# Patient Record
Sex: FEMALE | Race: WHITE
[De-identification: names, ages, dates, MRNs, and addresses within clinical notes are randomized per-mention and may not be internally consistent; named-entity substitution may affect disease eponyms.]

---

## 2018-05-21 ENCOUNTER — HOSPITAL ENCOUNTER (EMERGENCY)
Dept: HOSPITAL 33 - ED | Age: 68
Discharge: HOME | End: 2018-05-21
Payer: MEDICARE

## 2018-05-21 VITALS — SYSTOLIC BLOOD PRESSURE: 166 MMHG | DIASTOLIC BLOOD PRESSURE: 77 MMHG | HEART RATE: 90 BPM | OXYGEN SATURATION: 97 %

## 2018-05-21 DIAGNOSIS — I10: ICD-10-CM

## 2018-05-21 DIAGNOSIS — I25.10: ICD-10-CM

## 2018-05-21 DIAGNOSIS — M10.9: ICD-10-CM

## 2018-05-21 DIAGNOSIS — Z79.899: ICD-10-CM

## 2018-05-21 DIAGNOSIS — M19.90: ICD-10-CM

## 2018-05-21 DIAGNOSIS — G47.30: ICD-10-CM

## 2018-05-21 DIAGNOSIS — I50.9: ICD-10-CM

## 2018-05-21 DIAGNOSIS — B02.9: Primary | ICD-10-CM

## 2018-05-21 DIAGNOSIS — E03.9: ICD-10-CM

## 2018-05-21 DIAGNOSIS — I25.2: ICD-10-CM

## 2018-05-21 PROCEDURE — 99283 EMERGENCY DEPT VISIT LOW MDM: CPT

## 2018-05-21 NOTE — ERPHSYRPT
- History of Present Illness


Time Seen by Provider: 05/21/18 17:38


Source: patient


Exam Limitations: no limitations


Patient Subjective Stated Complaint: pt here for hives to forehead and hair 

since friday,


Triage Nursing Assessment: pt alert, resp easy, skin w/d/p. had red hives to 

forehead and hair


Physician History: 





The patient is a 67-year-old female who complains of a itchy painful splotchy 

red rash over her left eyebrow, left fore head, and into her scalp on the left 

side her head since Friday.  She also states that she had some of the rash on 

the right side of her head in the scalp as well.  On Saturday and Sunday she 

had some of the rash on the left side of her face including cheek and the left 

side of her neck that has disappeared.  She had placed over-the-counter 

hydrocortisone cream on her neck and face.  She's been taking Benadryl.  She 

thought she was having allergic reaction because she used to take allergy 

shots.  The swelling and redness around her left eye has improved.  She comes 

in because she doesn't know what it is is causing this rash.  Her past medical 

history is significant for gout, congestive heart failure, and hypertension.


Timing/Duration: day(s) (3), gradual onset, improved


Quality: itchy, painful


Severity: moderate


Location: scalp, face


Possible Causes: no cause identified


Modifying Factors: Improves With: antihistamine, prednisone (pt took 2 

prednisone without relieft), topical steriods


Associated Symptoms: rash, No difficulty breathing


Allergies/Adverse Reactions: 








Penicillins Allergy (Verified 05/21/18 16:50)


 


Sulfa (Sulfonamide Antibiotics) Allergy (Verified 05/21/18 16:50)


 





Home Medications: 








Allopurinol [Allopurinol] 100 mg DAILY 05/21/18 [History]


Levothyroxine Sodium 75 mcg DAILY 05/21/18 [History]


Lisinopril [Lisinopril] 40 mg DAILY 05/21/18 [History]


Oxybutynin [Oxytrol] 1 ea DAILY 05/21/18 [History]





Hx Influenza Vaccination/Date Given: Yes


Hx Pneumococcal Vaccination/Date Given: No


Immunizations Up to Date: Yes





- Review of Systems


Constitutional: No Fever, No Chills


Eyes: No Symptoms


Ears, Nose, & Throat: No Symptoms


Respiratory: No Cough, No Dyspnea


Cardiac: No Chest Pain, No Edema, No Syncope


Abdominal/Gastrointestinal: No Abdominal Pain, No Nausea, No Vomiting, No 

Diarrhea


Genitourinary Symptoms: No Dysuria


Musculoskeletal: No Back Pain, No Neck Pain


Skin: Rash


Neurological: No Dizziness, No Focal Weakness, No Sensory Changes


Psychological: No Symptoms


Endocrine: No Symptoms


Hematologic/Lymphatic: No Symptoms


Immunological/Allergic: No Symptoms


All Other Systems: Reviewed and Negative





- Past Medical History


Neurological History: No Pertinent History


Cardiac History: Congestive Heart Failure, Coronary Artery Disease, Hypertension

, Myocardial Infarction (MI)


Respiratory History: Sleep Apnea


Endocrine Medical History: Hypothyroidism


Musculoskeletal History: Osteoarthritis


Other Medical History: GOUT, SYTNHROID, ALLERGIES FOOD AND ENVIRONMENTAL





- Past Surgical History


Gastrointestinal: Cholecystectomy


Female Surgical History: Hysterectomy





- Social History


Smoking Status: Never smoker


Exposure to second hand smoke: No


Drug Use: marijuana


Patient Lives Alone: No





- Female History


Hx Last Menstrual Period: post


Hx Pregnant Now: No





- Nursing Vital Signs


Nursing Vital Signs: 





 Initial Vital Signs











Temperature  98.1 F   05/21/18 16:40


 


Pulse Rate  90   05/21/18 16:40


 


Respiratory Rate  18   05/21/18 16:40


 


Blood Pressure  166/77   05/21/18 16:40


 


O2 Sat by Pulse Oximetry  96   05/21/18 16:40








 Pain Scale











Pain Intensity                 7

















- Physical Exam


General Appearance: no apparent distress, alert


Eye Exam: PERRL/EOMI, eyes nml inspection


Ears, Nose, Throat Exam: normal ENT inspection, pharynx normal, moist mucous 

membranes


Neck Exam: normal inspection, non-tender, supple, full range of motion


Respiratory Exam: normal breath sounds, lungs clear, No respiratory distress


Cardiovascular Exam: regular rate/rhythm, normal heart sounds


Gastrointestinal/Abdomen Exam: soft, mass, No tenderness


Pelvic Exam: not done


Rectal Exam: not done


Back Exam: normal inspection, normal range of motion, No CVA tenderness, No 

vertebral tenderness


Extremity Exam: normal inspection, normal range of motion


Neurologic Exam: alert, oriented x 3, cooperative, normal mood/affect, 

sensation nml, No motor deficits


Skin Exam: rash (There is a scattered red rash beginning at the left eyebrow 

extending up into the left fore head and over into the left scalp.  Some of the 

rash has obvious scabs, especially those in the scalp.  Some of the rash on the 

forehead is red, swollen.  There are no obvious liquid filled blisters in the 

rash.)


**SpO2 Interpretation**: normal


SpO2: 97


Oxygen Delivery: Room Air





- Progress


Progress Note: 





05/21/18 17:44


I discussed with the patient possibility that this may be an atypical 

presentation of shingles.  I instructed her to take acyclovir 800 mg 5 times a 

day beginning immediately  I also instructed her to follow up first thing 

tomorrow morning with her eye doctor if the condition has worsen so that she 

can be directed to a [facility] ophthalmologist.


Counseled pt/family regarding: diagnosis, need for follow-up





- Departure


Time of Disposition: 17:46


Departure Disposition: Home


Clinical Impression: 


 Shingles





Condition: Stable


Critical Care Time: No


Referrals: 


KRISTI HENRY NP [Primary Care Provider] - 


Additional Instructions: 


You have a rash on your left forehead and left scalp that may be an atypical 

presentation of shingles.  Take acyclovir 800 mg 5 times a day for 7 days.  

Take Tylenol and ibuprofen as needed.  If you have worsening condition tomorrow 

morning, please contact your local eye doctor for immediate referral to an 

ophthalmologist.


Prescriptions: 


Acyclovir 800 mg*** [Zovirax 800 mg***] 800 mg PO 5XD #35 tablet

## 2019-01-12 ENCOUNTER — HOSPITAL ENCOUNTER (EMERGENCY)
Dept: HOSPITAL 33 - ED | Age: 69
Discharge: HOME | End: 2019-01-12
Payer: MEDICARE

## 2019-01-12 VITALS — DIASTOLIC BLOOD PRESSURE: 91 MMHG | OXYGEN SATURATION: 99 % | SYSTOLIC BLOOD PRESSURE: 147 MMHG | HEART RATE: 71 BPM

## 2019-01-12 DIAGNOSIS — Z79.899: ICD-10-CM

## 2019-01-12 DIAGNOSIS — B02.9: Primary | ICD-10-CM

## 2019-01-12 PROCEDURE — 99283 EMERGENCY DEPT VISIT LOW MDM: CPT

## 2019-01-12 NOTE — ERPHSYRPT
- History of Present Illness


Time Seen by Provider: 01/12/19 10:21


Source: patient


Exam Limitations: no limitations


Patient Subjective Stated Complaint: pt here for possible shingles to top of 

head, with pain and swelling to left side of face.


Triage Nursing Assessment: pt alert, walked in with walker, nurse unable to 

find rash, no swelling to face


Physician History: 





The patient is a 68-year-old female complaining of a possible recurrence of 

shingles to her scalp.  The patient had left sided scalp shingles last May that 

extended down over her left forehead across her eye.  She saw an 

ophthalmologist who confirmed that she had ophthalmologic shingles.  She states 

she lost some vision in that eye.  Last night she developed a small single rash 

on the right side of her scalp.  She is feeling the same kind of pain and 

tingling down her right scalp and neck that she felt last time when it was on 

the left side.  She is concerned she may have a recurrence of shingles.  





Her past medical history is significant for shingles, CHF, HTN, gout, and 

hypothyroidism.


Timing/Duration: yesterday, gradual onset


Quality: burning, painful


Severity: moderate


Location: scalp, neck


Possible Causes: other (shingles)


Associated Symptoms: rash


Allergies/Adverse Reactions: 








Iodinated Contrast- Oral and IV Dye Allergy (Verified 01/12/19 10:11)


 


meperidine [From Demerol] Allergy (Verified 01/12/19 10:11)


 


 turns green 


Penicillins Allergy (Verified 01/12/19 10:10)


 


Sulfa (Sulfonamide Antibiotics) Allergy (Verified 01/12/19 10:10)


 





Home Medications: 








Allopurinol 400 mg DAILY 05/21/18 [History]


Levothyroxine Sodium 75 mcg DAILY 05/21/18 [History]


Lisinopril 40 mg DAILY 05/21/18 [History]


Oxybutynin [Oxytrol] 1 ea DAILY 05/21/18 [History]


Carvedilol 3.125 mg*** [Coreg 3.125 MG***] 1.25 mg BID 01/12/19 [History]





Hx Tetanus, Diphtheria Vaccination/Date Given: No


Hx Influenza Vaccination/Date Given: No


Hx Pneumococcal Vaccination/Date Given: No


Immunizations Up to Date: Yes





- Review of Systems


Constitutional: No Fever, No Chills


Eyes: No Symptoms


Ears, Nose, & Throat: No Symptoms


Respiratory: No Cough, No Dyspnea


Cardiac: No Chest Pain, No Edema, No Syncope


Abdominal/Gastrointestinal: No Abdominal Pain, No Nausea, No Vomiting, No 

Diarrhea


Genitourinary Symptoms: No Dysuria


Musculoskeletal: No Back Pain, No Neck Pain


Skin: Rash


Neurological: No Dizziness, No Focal Weakness, No Sensory Changes


Psychological: No Symptoms


Endocrine: No Symptoms


Hematologic/Lymphatic: No Symptoms


Immunological/Allergic: No Symptoms


All Other Systems: Reviewed and Negative





- Past Medical History


Neurological History: No Pertinent History


Cardiac History: Congestive Heart Failure, Coronary Artery Disease, Hypertension

, Myocardial Infarction (MI)


Respiratory History: Sleep Apnea


Endocrine Medical History: Hypothyroidism


Musculoskeletal History: Osteoarthritis


Other Medical History: GOUT, SYTNHROID, ALLERGIES FOOD AND ENVIRONMENTAL





- Past Surgical History


Gastrointestinal: Cholecystectomy


Female Surgical History: Hysterectomy





- Social History


Smoking Status: Never smoker


Exposure to second hand smoke: No


Drug Use: marijuana


Patient Lives Alone: Yes





- Female History


Hx Last Menstrual Period: psot


Hx Pregnant Now: No





- Nursing Vital Signs


Nursing Vital Signs: 





 Initial Vital Signs











Temperature  97.0 F   01/12/19 10:01


 


Pulse Rate  71   01/12/19 10:01


 


Respiratory Rate  16   01/12/19 10:01


 


Blood Pressure  147/91   01/12/19 10:01


 


O2 Sat by Pulse Oximetry  98   01/12/19 10:01








 Pain Scale











Pain Intensity                 5

















- Physical Exam


General Appearance: no apparent distress, obese


Eye Exam: PERRL/EOMI, eyes nml inspection


Ears, Nose, Throat Exam: normal ENT inspection, pharynx normal, moist mucous 

membranes


Neck Exam: normal inspection, non-tender, supple, full range of motion


Respiratory Exam: normal breath sounds, lungs clear, No respiratory distress


Cardiovascular Exam: regular rate/rhythm, normal heart sounds


Gastrointestinal/Abdomen Exam: soft, mass, No tenderness


Pelvic Exam: not done


Rectal Exam: not done


Back Exam: normal inspection, normal range of motion, No CVA tenderness, No 

vertebral tenderness


Extremity Exam: normal inspection, normal range of motion


Neurologic Exam: alert, oriented x 3, cooperative, CNs II-XII nml as tested, 

normal mood/affect, nml cerebellar function, sensation nml, No motor deficits, 

No motor weakness, No facial droop, No slurred speech, No aphasia, No dysarthria

, No EOM palsy


Skin Exam: rash (Examination of the skin: There is a tiny red non-fluid filled 

macule on the right side of the vertex of the scalp.  There is no surrounding 

erythema on the rest of the scalp or on the right side of the neck.  Inspection 

of the skin around the eye is normal)


SpO2: 99


Oxygen Delivery: Room Air





- Departure


Time of Disposition: 10:37


Departure Disposition: Home


Clinical Impression: 


 Shingles





Condition: Stable


Critical Care Time: No


Referrals: 


KRISTI HENRY, NP [Primary Care Provider] - 


Additional Instructions: 


You may once again have another outbreak of shingles.  Because it is so very 

early, it is difficult to know with certainty at this time.  Because of your 

history of ophthalmologic shingles on the left side of your face, I want you to 

take acyclovir 800 mg 5 times a day for 10 days.  If the condition worsens and 

begins to show up over your right face, immediately seek care from an 

ophthalmologist.  Otherwise, follow up with a neurologist for further 

evaluation.


Prescriptions: 


Acyclovir 800 mg*** [Zovirax 800 mg***] 800 mg PO 5XD #50 tablet